# Patient Record
Sex: FEMALE | URBAN - METROPOLITAN AREA
[De-identification: names, ages, dates, MRNs, and addresses within clinical notes are randomized per-mention and may not be internally consistent; named-entity substitution may affect disease eponyms.]

---

## 2017-02-14 ENCOUNTER — APPOINTMENT (RX ONLY)
Dept: URBAN - METROPOLITAN AREA CLINIC 46 | Facility: CLINIC | Age: 57
Setting detail: DERMATOLOGY
End: 2017-02-14

## 2017-02-14 DIAGNOSIS — L70.0 ACNE VULGARIS: ICD-10-CM | Status: IMPROVED

## 2017-02-14 PROBLEM — F41.9 ANXIETY DISORDER, UNSPECIFIED: Status: ACTIVE | Noted: 2017-02-14

## 2017-02-14 PROCEDURE — ? PRESCRIPTION

## 2017-02-14 PROCEDURE — 99212 OFFICE O/P EST SF 10 MIN: CPT

## 2017-02-14 PROCEDURE — ? TREATMENT REGIMEN

## 2017-02-14 RX ORDER — TRETINOIN 0.25 MG/G
CREAM TOPICAL
Qty: 1 | Refills: 0 | Status: ERX | COMMUNITY
Start: 2017-02-14

## 2017-02-14 RX ADMIN — TRETINOIN: 0.25 CREAM TOPICAL at 20:47

## 2017-02-14 NOTE — PROCEDURE: TREATMENT REGIMEN
Initiate Treatment: SC prufying cleanser BID, journee cream QAM
Detail Level: Zone
Otc Regimen: Cera ve baby moisturizer

## 2017-02-14 NOTE — HPI: COSMETIC (FACIAL)
Have You Had A Facial Before?: has not had a previous facial
When Outside In The Sun, Do You...: always tans, never burns

## 2017-03-14 ENCOUNTER — APPOINTMENT (RX ONLY)
Dept: URBAN - METROPOLITAN AREA CLINIC 46 | Facility: CLINIC | Age: 57
Setting detail: DERMATOLOGY
End: 2017-03-14

## 2017-03-14 DIAGNOSIS — L70.0 ACNE VULGARIS: ICD-10-CM

## 2017-03-14 PROCEDURE — ? TREATMENT REGIMEN

## 2017-03-14 PROCEDURE — 99213 OFFICE O/P EST LOW 20 MIN: CPT

## 2017-03-14 PROCEDURE — ? COUNSELING

## 2017-03-14 ASSESSMENT — LOCATION SIMPLE DESCRIPTION DERM: LOCATION SIMPLE: LEFT CHEEK

## 2017-03-14 ASSESSMENT — LOCATION ZONE DERM: LOCATION ZONE: FACE

## 2017-03-14 ASSESSMENT — LOCATION DETAILED DESCRIPTION DERM: LOCATION DETAILED: LEFT MEDIAL MALAR CHEEK

## 2017-03-14 NOTE — PROCEDURE: TREATMENT REGIMEN
Discontinue Regimen: Tretinoin .025 crm
Detail Level: Zone
Samples Given: Skin better alpharet QHS and Skin Better day cream

## 2017-11-09 ENCOUNTER — APPOINTMENT (RX ONLY)
Dept: URBAN - METROPOLITAN AREA CLINIC 46 | Facility: CLINIC | Age: 57
Setting detail: DERMATOLOGY
End: 2017-11-09

## 2017-11-09 DIAGNOSIS — L82.0 INFLAMED SEBORRHEIC KERATOSIS: ICD-10-CM

## 2017-11-09 PROCEDURE — ? BENIGN DESTRUCTION

## 2017-11-09 PROCEDURE — ? COUNSELING

## 2017-11-09 PROCEDURE — ? SEPARATE AND IDENTIFIABLE DOCUMENTATION

## 2017-11-09 PROCEDURE — 17110 DESTRUCTION B9 LES UP TO 14: CPT

## 2017-11-09 PROCEDURE — 99213 OFFICE O/P EST LOW 20 MIN: CPT | Mod: 25

## 2017-11-09 ASSESSMENT — LOCATION ZONE DERM: LOCATION ZONE: FACE

## 2017-11-09 ASSESSMENT — LOCATION SIMPLE DESCRIPTION DERM: LOCATION SIMPLE: RIGHT EYEBROW

## 2017-11-09 ASSESSMENT — LOCATION DETAILED DESCRIPTION DERM: LOCATION DETAILED: RIGHT CENTRAL EYEBROW

## 2017-11-09 NOTE — PROCEDURE: BENIGN DESTRUCTION
Consent: The patient's consent was obtained including but not limited to risks of crusting, scabbing, blistering, scarring, darker or lighter pigmentary change, recurrence, incomplete removal and infection.
Render Post-Care Instructions In Note?: no
Anesthesia Volume In Cc: 0.5
Treatment Number (Will Not Render If 0): 0
Detail Level: Simple
Post-Care Instructions: I reviewed with the patient in detail post-care instructions. Patient is to wear sunprotection, and avoid picking at any of the treated lesions. Pt may apply Vaseline to crusted or scabbing areas.
Medical Necessity Information: It is in your best interest to select a reason for this procedure from the list below. All of these items fulfill various CMS LCD requirements except the new and changing color options.
Medical Necessity Clause: This procedure was medically necessary because the lesions that were treated were:

## 2018-05-03 ENCOUNTER — APPOINTMENT (RX ONLY)
Dept: URBAN - METROPOLITAN AREA CLINIC 40 | Facility: CLINIC | Age: 58
Setting detail: DERMATOLOGY
End: 2018-05-03

## 2018-05-03 DIAGNOSIS — L57.8 OTHER SKIN CHANGES DUE TO CHRONIC EXPOSURE TO NONIONIZING RADIATION: ICD-10-CM

## 2018-05-03 DIAGNOSIS — L72.8 OTHER FOLLICULAR CYSTS OF THE SKIN AND SUBCUTANEOUS TISSUE: ICD-10-CM

## 2018-05-03 PROCEDURE — ? BIOPSY BY PUNCH METHOD

## 2018-05-03 PROCEDURE — ? PUNCH EXCISION (NO PATHOLOGY)

## 2018-05-03 PROCEDURE — 11440 EXC FACE-MM B9+MARG 0.5 CM/<: CPT

## 2018-05-03 PROCEDURE — 99213 OFFICE O/P EST LOW 20 MIN: CPT | Mod: 25

## 2018-05-03 PROCEDURE — ? SEPARATE AND IDENTIFIABLE DOCUMENTATION

## 2018-05-03 PROCEDURE — ? COUNSELING

## 2018-05-03 ASSESSMENT — LOCATION DETAILED DESCRIPTION DERM: LOCATION DETAILED: RIGHT CENTRAL MALAR CHEEK

## 2018-05-03 ASSESSMENT — LOCATION SIMPLE DESCRIPTION DERM: LOCATION SIMPLE: RIGHT CHEEK

## 2018-05-03 ASSESSMENT — LOCATION ZONE DERM: LOCATION ZONE: FACE

## 2018-05-03 NOTE — PROCEDURE: BIOPSY BY PUNCH METHOD
Bill For Surgical Tray: no
Anesthesia Type: 1% lidocaine with epinephrine
Additional Anesthesia Volume In Cc (Will Not Render If 0): 0
Lab Facility: [id number]
Was A Bandage Applied: Yes
Hemostasis: None
Punch Size In Mm: 3
Suture Removal: 10 days
Consent: Written consent was obtained and risks were reviewed including but not limited to scarring, infection, bleeding, scabbing, incomplete removal, nerve damage and allergy to anesthesia.
Dressing: bandage
Anesthesia Volume In Cc (Will Not Render If 0): 0.5
Notification Instructions: Patient will be notified of biopsy results. However, patient instructed to call the office if not contacted within 2 weeks.
Wound Care: Petrolatum
Post-Care Instructions: I reviewed with the patient in detail post-care instructions. Patient is to keep the biopsy site dry overnight, and then apply bacitracin twice daily until healed. Patient may apply hydrogen peroxide soaks to remove any crusting.
Biopsy Type: H and E
Home Suture Removal Text: Patient was provided a home suture removal kit and will remove their sutures at home.  If they have any questions or difficulties they will call the office.
Billing Type: Third-Party Bill
Detail Level: Detailed
Epidermal Sutures: 5-0 Ethilon
Lab: [id number]

## 2018-05-11 ENCOUNTER — APPOINTMENT (RX ONLY)
Dept: URBAN - METROPOLITAN AREA CLINIC 40 | Facility: CLINIC | Age: 58
Setting detail: DERMATOLOGY
End: 2018-05-11

## 2018-05-11 DIAGNOSIS — Z48.02 ENCOUNTER FOR REMOVAL OF SUTURES: ICD-10-CM

## 2018-05-11 PROCEDURE — 99024 POSTOP FOLLOW-UP VISIT: CPT

## 2018-05-11 PROCEDURE — ? SUTURE REMOVAL (GLOBAL PERIOD)

## 2018-05-11 ASSESSMENT — LOCATION DETAILED DESCRIPTION DERM: LOCATION DETAILED: RIGHT INFERIOR CENTRAL MALAR CHEEK

## 2018-05-11 ASSESSMENT — LOCATION SIMPLE DESCRIPTION DERM: LOCATION SIMPLE: RIGHT CHEEK

## 2018-05-11 ASSESSMENT — LOCATION ZONE DERM: LOCATION ZONE: FACE

## 2018-05-11 NOTE — PROCEDURE: SUTURE REMOVAL (GLOBAL PERIOD)
Detail Level: Detailed
Add 84999 Cpt? (Important Note: In 2017 The Use Of 04165 Is Being Tracked By Cms To Determine Future Global Period Reimbursement For Global Periods): yes

## 2021-12-15 NOTE — PROCEDURE: PUNCH EXCISION (NO PATHOLOGY)
[FreeTextEntry1] : no health issues except asthma exacerbation\par recently back from [place] so was advised to have a telehealth appointment \par current weight 142 lb.  \par is always cold and experiencing memory loss.\par no palpitations or feeling jittery \par will see PCP on Jan 4 and will have labs done then\par \par FH: granddaughter has type 1 DM and celiac dz\par brother has type 2 DM\par \par Meds: \par Synthroid  100mcg JAIME, 6.5 tab per week\par liothyronine 5mcg bid\par prozac 60mg, Lamictal 200mg\par metformin 850mg BID (ran out)\par Vyvanse 70mg\par Ambien 10mg hs\par Colace\par Previous Meds: metformin (not working), phentermine, Victoza, Topiramate
Wound Care: Bacitracin
Medical Necessity Clause: The excision was medically necessary because the lesion which was excised was
Epidermal Closure: simple interrupted
Post-Care Instructions: I reviewed with the patient in detail post-care instructions. Patient is not to engage in any heavy lifting, exercise, or swimming for the next 14 days. Should the patient develop any fevers, chills, bleeding, severe pain patient will contact the office immediately.
Detail Level: Detailed
Medical Necessity Clause: This procedure was medically necessary because the lesion that was treated was:
Hemostasis: Pressure
3 Mm Punch Excision Text: A 3 mm punch biopsy was used to make an initial incision over the lesion.  After this overlying column of skin was removed, blunt dissection was used to free the lesion from the surrounding tissues and the lesion was extirpated through the surgical opening made by the punch biopsy.
Deep Sutures: 5-0 Vicryl
Purse String (Intermediate) Text: Given the location of the defect and the characteristics of the surrounding skin a purse string intermediate closure was deemed most appropriate.  Undermining was performed circumfirentially around the surgical defect.  A purse string suture was then placed and tightened.
2 Mm Punch Excision Text: A 2 mm punch biopsy was used to make an initial incision over the lesion.  After this overlying column of skin was removed, blunt dissection was used to free the lesion from the surrounding tissues and the lesion was extirpated through the surgical opening made by the punch biopsy.
5 Mm Punch Excision Text: A 5 mm punch biopsy was used to make an initial incision over the lesion.  After this overlying column of skin was removed, blunt dissection was used to free the lesion from the surrounding tissues and the lesion was extirpated through the surgical opening made by the punch biopsy.
12 Mm Punch Excision Text: A 12 mm punch biopsy was used to make an initial incision over the lesion.  After this overlying column of skin was removed, blunt dissection was used to free the lesion from the surrounding tissues and the lesion was extirpated through the surgical opening made by the punch biopsy.
Bill For Surgical Tray: no
Complex Repair Preamble Text (Leave Blank If You Do Not Want): Extensive wide undermining was performed.
Repair Type: None (Simple)
Size Of Lesion In Cm: 0.5
6 Mm Punch Excision Text: A 6 mm punch biopsy was used to make an initial incision over the lesion.  After this overlying column of skin was removed, blunt dissection was used to free the lesion from the surrounding tissues and the lesion was extirpated through the surgical opening made by the punch biopsy.
Intermediate Repair Preamble Text (Leave Blank If You Do Not Want): Undermining was performed with blunt dissection.
Anesthesia Volume In Cc: 3
Suture Removal: 7 days
Additional Anesthesia Volume In Cc: 6
Anesthesia Type: 1% lidocaine with epinephrine
Render Post-Care Instructions In Note?: yes
10 Mm Punch Excision Text: A 10 mm punch biopsy was used to make an initial incision over the lesion.  After this overlying column of skin was removed, blunt dissection was used to free the lesion from the surrounding tissues and the lesion was extirpated through the surgical opening made by the punch biopsy.
Excision Method: 3 mm Punch
8 Mm Punch Excision Text: A 8 mm punch biopsy was used to make an initial incision over the lesion.  After this overlying column of skin was removed, blunt dissection was used to free the lesion from the surrounding tissues and the lesion was extirpated through the surgical opening made by the punch biopsy.
4 Mm Punch Excision Text: A 4 mm punch biopsy was used to make an initial incision over the lesion.  After this overlying column of skin was removed, blunt dissection was used to free the lesion from the surrounding tissues and the lesion was extirpated through the surgical opening made by the punch biopsy.
Consent was obtained from the patient. The risks and benefits to therapy were discussed in detail. Specifically, the risks of infection, scarring, bleeding, prolonged wound healing, incomplete removal, allergy to anesthesia, nerve injury and recurrence were addressed. Prior to the procedure, the treatment site was clearly identified and confirmed by the patient. All components of Universal Protocol/PAUSE Rule completed.
Excision Depth: adipose tissue
Anesthesia Volume In Cc: 0
Epidermal Sutures: 4-0 Ethilon
Path Notes (To The Dermatopathologist): Please check margins.
Estimated Blood Loss (Cc): minimal
4.5 Mm Punch Excision Text: A 4.5 mm punch biopsy was used to make an initial incision over the lesion.  After this overlying column of skin was removed, blunt dissection was used to free the lesion from the surrounding tissues and the lesion was extirpated through the surgical opening made by the punch biopsy.
Dressing: dry sterile dressing
7 Mm Punch Excision Text: A 7 mm punch biopsy was used to make an initial incision over the lesion.  After this overlying column of skin was removed, blunt dissection was used to free the lesion from the surrounding tissues and the lesion was extirpated through the surgical opening made by the punch biopsy.